# Patient Record
Sex: MALE | Race: WHITE | NOT HISPANIC OR LATINO | Employment: FULL TIME | URBAN - METROPOLITAN AREA
[De-identification: names, ages, dates, MRNs, and addresses within clinical notes are randomized per-mention and may not be internally consistent; named-entity substitution may affect disease eponyms.]

---

## 2024-11-19 ENCOUNTER — TELEPHONE (OUTPATIENT)
Dept: UROLOGY | Facility: CLINIC | Age: 59
End: 2024-11-19
Payer: COMMERCIAL

## 2024-11-19 NOTE — TELEPHONE ENCOUNTER
Contacted pt, rescheduled appt per request. BJP    ----- Message from Michelle sent at 11/19/2024  8:23 AM CST -----  Contact: ester (Spouse)  Patient is requesting a call back regarding rescheduling with a NP for something sooner. Please call back at [phone]

## 2024-11-22 ENCOUNTER — OFFICE VISIT (OUTPATIENT)
Dept: UROLOGY | Facility: CLINIC | Age: 59
End: 2024-11-22
Payer: COMMERCIAL

## 2024-11-22 VITALS
DIASTOLIC BLOOD PRESSURE: 78 MMHG | BODY MASS INDEX: 27.83 KG/M2 | HEART RATE: 64 BPM | HEIGHT: 73 IN | SYSTOLIC BLOOD PRESSURE: 126 MMHG | WEIGHT: 210 LBS | OXYGEN SATURATION: 98 %

## 2024-11-22 DIAGNOSIS — N52.1 ERECTILE DYSFUNCTION DUE TO DISEASES CLASSIFIED ELSEWHERE: Primary | ICD-10-CM

## 2024-11-22 RX ORDER — SILDENAFIL 50 MG/1
50 TABLET, FILM COATED ORAL DAILY PRN
Qty: 30 TABLET | Refills: 0 | Status: SHIPPED | OUTPATIENT
Start: 2024-11-22 | End: 2024-12-22

## 2024-11-22 RX ORDER — CARBIDOPA AND LEVODOPA 25; 100 MG/1; MG/1
TABLET ORAL
COMMUNITY
Start: 2024-10-15

## 2024-11-22 RX ORDER — HYDROCORTISONE 25 MG/G
CREAM TOPICAL
COMMUNITY
Start: 2024-07-30

## 2024-11-22 RX ORDER — RASAGILINE 1 MG/1
1 TABLET ORAL
COMMUNITY
Start: 2024-08-14

## 2024-11-22 RX ORDER — TESTOSTERONE CYPIONATE 200 MG/ML
INJECTION, SOLUTION INTRAMUSCULAR
COMMUNITY

## 2024-11-22 RX ORDER — TADALAFIL 20 MG/1
TABLET ORAL
COMMUNITY
Start: 2024-11-11 | End: 2024-11-22

## 2024-11-22 RX ORDER — OMEPRAZOLE 40 MG/1
40 CAPSULE, DELAYED RELEASE ORAL
COMMUNITY
Start: 2024-09-03

## 2024-11-22 RX ORDER — ESCITALOPRAM OXALATE 10 MG/1
10 TABLET ORAL NIGHTLY
COMMUNITY
Start: 2024-10-28

## 2024-11-22 NOTE — PROGRESS NOTES
Subjective:       Patient ID: Adeel Green is a 59 y.o. male.    Chief Complaint: Erectile Dysfunction      HPI: 59-year-old male new patient presents today for erectile dysfunction.  Patient reports at the beginning of this year he was diagnosed with Parkinson's.  After being started on medications for management of his Parkinson's he was having difficulty obtaining an erection.  He tried generic Viagra and reports it was unsuccessful so his primary care doctor started him on Cialis 20 mg daily and TRT for hypogonadism.  Patient reports that he is now able to obtain and maintain an erection on Cialis daily however he is unable to ejaculate.       Past Medical History:   Past Medical History:   Diagnosis Date    GERD (gastroesophageal reflux disease)     Parkinson's disease        Past Surgical Historical:   Past Surgical History:   Procedure Laterality Date    BIOPSY OF TONSILS          Medications:   Medication List with Changes/Refills   New Medications    SILDENAFIL (VIAGRA) 50 MG TABLET    Take 1 tablet (50 mg total) by mouth daily as needed for Erectile Dysfunction (1-2 tabs as needed for effectiveness).   Current Medications    CARBIDOPA-LEVODOPA  MG (SINEMET)  MG PER TABLET    TAKE ONE Tablet BY MOUTH FOUR TIMES DAILY upon awakening seperate doses by AT least four hours    ESCITALOPRAM OXALATE (LEXAPRO) 10 MG TABLET    Take 10 mg by mouth every evening. at bedtime.    HYDROCORTISONE 2.5 % CREAM    Apply to the affected area(s) TWICE DAILY FOR seven DAYS avoid eyelids    OMEPRAZOLE (PRILOSEC) 40 MG CAPSULE    Take 40 mg by mouth.    RASAGILINE (AZILECT) 1 MG TAB    Take 1 mg by mouth.    TESTOSTERONE CYPIONATE (DEPOTESTOTERONE CYPIONATE) 200 MG/ML INJECTION    INJECT 0.5ML INTRAMUSCULARLY EVERY 2 WEEKS   Discontinued Medications    TADALAFIL (CIALIS) 20 MG TAB    TAKE 1 TABLET BY MOUTH ONCE daily, 30 TO 45 minutes prior TO anticipated activity        Past Social History:   Social History      Socioeconomic History    Marital status:    Tobacco Use    Smoking status: Never     Passive exposure: Never    Smokeless tobacco: Never   Substance and Sexual Activity    Alcohol use: Yes     Alcohol/week: 1.0 standard drink of alcohol     Types: 1 Cans of beer per week    Drug use: Never    Sexual activity: Yes     Partners: Female       Allergies: Review of patient's allergies indicates:  No Known Allergies     Family History: No family history on file.     Review of Systems:  Review of Systems   Constitutional: Negative.    HENT: Negative.     Eyes: Negative.    Respiratory: Negative.     Cardiovascular: Negative.    Gastrointestinal: Negative.    Endocrine: Negative.    Genitourinary: Negative.    Musculoskeletal: Negative.    Skin: Negative.    Allergic/Immunologic: Negative.    Neurological: Negative.    Hematological: Negative.    Psychiatric/Behavioral: Negative.       Physical Exam:  Physical Exam  Constitutional:       Appearance: Normal appearance.   Cardiovascular:      Rate and Rhythm: Normal rate.   Pulmonary:      Effort: Pulmonary effort is normal.   Abdominal:      General: Bowel sounds are normal.      Palpations: Abdomen is soft.   Neurological:      Mental Status: He is alert and oriented to person, place, and time.   Urinalysis: Negative  Assessment/Plan:     Erectile dysfunction:  Patient saw good response with Cialis for obtaining and maintaining an erection however he was unable to ejaculate.  We did discuss TriMix injections however patient does not wish to try medication at this time.  Now that patient is on TRT and has desire again, we will retry Viagra as needed.  Did discuss with patient if Viagra was ineffective that we may have to do combination therapy with low-dose Cialis and Viagra as needed and see if low-dose Cialis still prevents him from ejaculating.  We will start him on Viagra 50 mg 1-2 tabs as needed for effectiveness.  Did educate patient on taking medication on  an empty stomach and not following a high fat meal.  Also instructed him to allow up to 4 hours for medication to be effective and informed him he would still require stimulation for the medication to work.  Patient verbalized understanding     Follow up in 6 weeks  Problem List Items Addressed This Visit    None  Visit Diagnoses       Erectile dysfunction due to diseases classified elsewhere    -  Primary

## 2025-01-03 ENCOUNTER — OFFICE VISIT (OUTPATIENT)
Dept: UROLOGY | Facility: CLINIC | Age: 60
End: 2025-01-03
Payer: COMMERCIAL

## 2025-01-03 VITALS — SYSTOLIC BLOOD PRESSURE: 128 MMHG | OXYGEN SATURATION: 95 % | DIASTOLIC BLOOD PRESSURE: 84 MMHG | HEART RATE: 83 BPM

## 2025-01-03 DIAGNOSIS — N52.1 ERECTILE DYSFUNCTION DUE TO DISEASES CLASSIFIED ELSEWHERE: Primary | ICD-10-CM

## 2025-01-03 RX ORDER — BUPROPION HYDROCHLORIDE 150 MG/1
1 TABLET ORAL EVERY MORNING
COMMUNITY
Start: 2024-12-06 | End: 2025-12-06

## 2025-01-03 RX ORDER — SILDENAFIL 100 MG/1
100 TABLET, FILM COATED ORAL DAILY PRN
Qty: 30 TABLET | Refills: 2 | Status: SHIPPED | OUTPATIENT
Start: 2025-01-03 | End: 2026-01-03

## 2025-01-03 RX ORDER — TADALAFIL 5 MG/1
5 TABLET ORAL DAILY PRN
Qty: 30 TABLET | Refills: 2 | Status: SHIPPED | OUTPATIENT
Start: 2025-01-03 | End: 2025-04-03

## 2025-01-03 NOTE — PROGRESS NOTES
Subjective:       Patient ID: Adeel Green is a 59 y.o. male.    Chief Complaint: No chief complaint on file.      HPI: 59-year-old male new patient presents today for erectile dysfunction.  Patient reports at the beginning of this year he was diagnosed with Parkinson's.  After being started on medications for management of his Parkinson's he was having difficulty obtaining an erection.  He tried generic Viagra and reports it was unsuccessful so his primary care doctor started him on Cialis 20 mg daily and TRT for hypogonadism.  He has since discontinued testosterone replacement.  He is also on Wellbutrin and Lexapro.  At his last visit he was prescribed Viagra 50 mg p.r.n..  Patient states that he has not seen any improvement and would like to discuss other options.  TriMix was discussed at his last visit however the patient is wanting to proceed with that option just yet.       Past Medical History:   Past Medical History:   Diagnosis Date    GERD (gastroesophageal reflux disease)     Parkinson's disease        Past Surgical Historical:   Past Surgical History:   Procedure Laterality Date    BIOPSY OF TONSILS          Medications:   Medication List with Changes/Refills   New Medications    SILDENAFIL (VIAGRA) 100 MG TABLET    Take 1 tablet (100 mg total) by mouth daily as needed for Erectile Dysfunction.    TADALAFIL (CIALIS) 5 MG TABLET    Take 1 tablet (5 mg total) by mouth daily as needed for Erectile Dysfunction.   Current Medications    BUPROPION (WELLBUTRIN XL) 150 MG TB24 TABLET    Take 1 tablet by mouth every morning.    CARBIDOPA-LEVODOPA  MG (SINEMET)  MG PER TABLET    TAKE ONE Tablet BY MOUTH FOUR TIMES DAILY upon awakening seperate doses by AT least four hours    ESCITALOPRAM OXALATE (LEXAPRO) 10 MG TABLET    Take 10 mg by mouth every evening. at bedtime.    HYDROCORTISONE 2.5 % CREAM    Apply to the affected area(s) TWICE DAILY FOR seven DAYS avoid eyelids    OMEPRAZOLE (PRILOSEC) 40 MG  CAPSULE    Take 40 mg by mouth.    RASAGILINE (AZILECT) 1 MG TAB    Take 1 mg by mouth.    SILDENAFIL (VIAGRA) 50 MG TABLET    Take 1 tablet (50 mg total) by mouth daily as needed for Erectile Dysfunction (1-2 tabs as needed for effectiveness).    TESTOSTERONE CYPIONATE (DEPOTESTOTERONE CYPIONATE) 200 MG/ML INJECTION    INJECT 0.5ML INTRAMUSCULARLY EVERY 2 WEEKS        Past Social History:   Social History     Socioeconomic History    Marital status:    Tobacco Use    Smoking status: Never     Passive exposure: Never    Smokeless tobacco: Never   Substance and Sexual Activity    Alcohol use: Yes     Alcohol/week: 1.0 standard drink of alcohol     Types: 1 Cans of beer per week    Drug use: Never    Sexual activity: Yes     Partners: Female       Allergies: Review of patient's allergies indicates:  No Known Allergies     Family History: No family history on file.     Review of Systems:  Review of Systems   Constitutional: Negative.    HENT: Negative.     Eyes: Negative.    Respiratory: Negative.     Cardiovascular: Negative.    Gastrointestinal: Negative.    Endocrine: Negative.    Genitourinary: Negative.    Musculoskeletal: Negative.    Skin: Negative.    Allergic/Immunologic: Negative.    Neurological: Negative.    Hematological: Negative.    Psychiatric/Behavioral: Negative.         Physical Exam:  Physical Exam  Constitutional:       Appearance: Normal appearance. He is normal weight.   HENT:      Head: Normocephalic.      Nose: Nose normal.      Mouth/Throat:      Mouth: Mucous membranes are moist.      Pharynx: Oropharynx is clear.   Eyes:      Extraocular Movements: Extraocular movements intact.      Conjunctiva/sclera: Conjunctivae normal.      Pupils: Pupils are equal, round, and reactive to light.   Cardiovascular:      Rate and Rhythm: Normal rate and regular rhythm.      Pulses: Normal pulses.      Heart sounds: Normal heart sounds.   Pulmonary:      Effort: Pulmonary effort is normal.      Breath  sounds: Normal breath sounds.   Abdominal:      General: Abdomen is flat. Bowel sounds are normal.      Palpations: Abdomen is soft.      Hernia: There is no hernia in the right inguinal area or left inguinal area.   Genitourinary:     Penis: Normal. No phimosis, paraphimosis, hypospadias, erythema, tenderness or discharge.       Testes: Normal.         Right: Mass, tenderness or swelling not present. Right testis is descended. Cremasteric reflex is present.          Left: Mass, tenderness or swelling not present. Left testis is descended. Cremasteric reflex is present.       Prostate: Normal.      Rectum: Normal.   Musculoskeletal:         General: Normal range of motion.      Cervical back: Normal range of motion and neck supple.   Lymphadenopathy:      Lower Body: No right inguinal adenopathy. No left inguinal adenopathy.   Skin:     General: Skin is warm and dry.      Capillary Refill: Capillary refill takes less than 2 seconds.   Neurological:      General: No focal deficit present.      Mental Status: He is alert and oriented to person, place, and time. Mental status is at baseline.   Psychiatric:         Mood and Affect: Mood normal.         Behavior: Behavior normal.         Thought Content: Thought content normal.         Judgment: Judgment normal.     Urinalysis: Negative  Assessment/Plan:     Erectile dysfunction:  Increase patient's Viagra to 100 mg p.r.n..  Instruction to take the medication empty stomach.  We had a long discussion about causes of erectile dysfunction.  The patient is currently prescribed Wellbutrin as well as Lexapro.  He had some concern about his libido and inability to ejaculate.  Educated the patient about delayed ejaculation when taking SSRIs.  Encouraged the patient to discuss medication changes or discontinuing the medication with his primary care.      Patient would like to follow up in 1 month with Dr. Hu.  Problem List Items Addressed This Visit    None  Visit Diagnoses        Erectile dysfunction due to diseases classified elsewhere    -  Primary

## 2025-01-07 ENCOUNTER — TELEPHONE (OUTPATIENT)
Dept: UROLOGY | Facility: CLINIC | Age: 60
End: 2025-01-07
Payer: COMMERCIAL

## 2025-01-07 NOTE — TELEPHONE ENCOUNTER
Pt wanted to know about both medications cialis and sildenafil. I explained he had tried 50mg sildenafil and it did not work so she gave him the 100mg to try. The cialis at 5mg can be taken every day. Pt states understanding.

## 2025-01-07 NOTE — TELEPHONE ENCOUNTER
----- Message from Juliette sent at 1/7/2025  3:11 PM CST -----  Patient requesting call back in regards to medication.. please call her back at [phone]

## 2025-03-05 ENCOUNTER — TELEPHONE (OUTPATIENT)
Dept: UROLOGY | Facility: CLINIC | Age: 60
End: 2025-03-05
Payer: COMMERCIAL

## 2025-03-05 NOTE — TELEPHONE ENCOUNTER
Spoke to wife and she was very upset about bill. Gave he the billing office number, she had already called and was informed that the bill will need to be paid and that there was nothing she could do. She stated that she was not happy and will not be coming back to the office. Hung up             ----- Message from Cathy sent at 3/5/2025 11:03 AM CST -----  Contact: YNES MAYO [[id number]]  ..Type:  Patient Requesting CallWho Called: YNES MAYO [[id number]]What is the call regarding?: pt got a bill in the mail for the room and has already paid the bill for that day and what's to know why she got billed twice. Would the patient rather a call back or a response via [facility]?  [employer] Call Back Number: [phone] Information:

## 2025-04-07 RX ORDER — TADALAFIL 5 MG/1
TABLET ORAL
Qty: 30 TABLET | Refills: 2 | Status: SHIPPED | OUTPATIENT
Start: 2025-04-07

## 2025-08-04 RX ORDER — SILDENAFIL 100 MG/1
TABLET, FILM COATED ORAL
Qty: 30 TABLET | Refills: 2 | Status: SHIPPED | OUTPATIENT
Start: 2025-08-04